# Patient Record
Sex: MALE | Race: WHITE | URBAN - METROPOLITAN AREA
[De-identification: names, ages, dates, MRNs, and addresses within clinical notes are randomized per-mention and may not be internally consistent; named-entity substitution may affect disease eponyms.]

---

## 2017-01-09 ENCOUNTER — TRANSFERRED RECORDS (OUTPATIENT)
Dept: HEALTH INFORMATION MANAGEMENT | Facility: CLINIC | Age: 62
End: 2017-01-09

## 2017-04-19 ENCOUNTER — TRANSFERRED RECORDS (OUTPATIENT)
Dept: HEALTH INFORMATION MANAGEMENT | Facility: CLINIC | Age: 62
End: 2017-04-19

## 2017-04-28 ENCOUNTER — TRANSFERRED RECORDS (OUTPATIENT)
Dept: HEALTH INFORMATION MANAGEMENT | Facility: CLINIC | Age: 62
End: 2017-04-28

## 2017-05-01 ENCOUNTER — TRANSFERRED RECORDS (OUTPATIENT)
Dept: HEALTH INFORMATION MANAGEMENT | Facility: CLINIC | Age: 62
End: 2017-05-01

## 2017-05-01 ENCOUNTER — MEDICAL CORRESPONDENCE (OUTPATIENT)
Dept: HEALTH INFORMATION MANAGEMENT | Facility: CLINIC | Age: 62
End: 2017-05-01

## 2017-05-04 NOTE — TELEPHONE ENCOUNTER
1.  Date/reason for appt: 17 - Distal Esophageal CA  2.  Referring provider: Dr Sammy Redmond    3.  Call to patient (Yes / No - short description): Yes  4.  Previous care at / records requested from: [facility], ZOILA Kurtz - in [facility]  5.  Recent Imagin17 CT chest, 17 CT abdomen, 17 Esophagram - in [facility]  6.  Path slides received 17    *Pt scheduled for PET CT on 17 @ 1614, prior to seeing Dr Hung*

## 2017-05-04 NOTE — TELEPHONE ENCOUNTER
1.  Date/reason for appt: 17 - Distal Esophageal CA  2.  Referring provider: Dr Sammy Redmond    3.  Call to patient (Yes / No - short description): Yes  4.  Previous care at / records requested from: [facility], ZOILA Kurtz - in [facility]  5.  Recent Imagin17 CT chest, 17 CT abdomen, 17 Esophagram - in [facility]

## 2017-05-05 PROCEDURE — 00000346 ZZHCL STATISTIC REVIEW OUTSIDE SLIDES TC 88321: Performed by: STUDENT IN AN ORGANIZED HEALTH CARE EDUCATION/TRAINING PROGRAM

## 2017-05-08 DIAGNOSIS — C15.9 MALIGNANT NEOPLASM OF ESOPHAGUS, UNSPECIFIED LOCATION (H): Primary | ICD-10-CM

## 2017-05-09 ASSESSMENT — ENCOUNTER SYMPTOMS
HALLUCINATIONS: 0
INCREASED ENERGY: 0
WEIGHT LOSS: 0
POLYDIPSIA: 0
CHILLS: 0
FEVER: 0
WEIGHT GAIN: 0
POLYPHAGIA: 0
DECREASED APPETITE: 0
FATIGUE: 1
ALTERED TEMPERATURE REGULATION: 0
NIGHT SWEATS: 0

## 2017-05-10 NOTE — PROGRESS NOTES
THORACIC SURGERY - NEW PATIENT OFFICE VISIT   Dear Dr. Redmond,    I saw Mr. Russo at your request in consultation for the evaluation and treatment of an esophageal cancer.     HPI  Mr. Samir Russo is a 61 year old year-old male with a newly diagnosed esophageal cancer. Patient has a history of obstructive sleep apnea and was having difficulty with his CPAP machine at home. He was seeing his primary care physician and ENT for this when he also was noted to have dysphagia and lots of burping. An EGD was performed which was notable for a GE junction mass, pathology confirmed adenocarcinoma. He is able to tolerate a regular diet. He has not had any unintentional weight loss or systemic signs of illness- reports he is otherwise doing well. He underwent a PET scan just prior to this appointment for his metastatic workup and presents in clinic today to discuss the workup and management of his esophageal cancer.     Previsit Tests   EGD performed on 4/28 with an EG junction mass. Biopsies from EGD showed invasive poorly to moderately differentiated adenocarcinoma.   CT Chest/abdomen/pelvis and esophogram from 4/28 show a GE junction mass.      PET scan performed early today (5/11) is not yet formally read but shows shows the known distal esophageal mass extending into the proximal stomach with multiple hypermetabolic local nodes suspicious for metastasis. Preliminary read also with upper abdominal and retroperitoneal nodes suspicious for jasmin metastasis.        PMH  Stroke   Obstructive sleep apnea   Dyslexia  Anxiety  Depression   Vitamin D deficiency  Arthritis    PSH  Heart catheterization, closure of PFO  Rotator cuff repair  Vasectomy    ETOH 1 beer with dinner nightly, though none for the past few weeks  TOB history of 4ppd smoking, quit in 1988    Physical examination  BMI 34.23  Well appearing male in no acute distress  Awake, alert and appropriate  Nonlabored breathing on room air  Regular rate and  rhythm  Chest and abdomen without any incisions  No supraclavicular or cervical lymphadenopathy    From a personal perspective, patient lives with his wife, Gunjan, in [place], [place]. They have one son, Tim, who is also at the appointment today and lives near them. He works in road construction.     IMPRESSION  (C15.9) Malignant neoplasm of esophagus, unspecified location (H)  (primary encounter diagnosis)    61 year old year-old male with a GE junction esophageal adenocarcinoma.     PLAN  I spent a total of 30 minutes with Mr. Russo and his family, more than 50% of which were spent in counseling, coordination of care, and face-to-face time. I reviewed the plan as follows:    Mr. Russo appears to have atleast a locoregionally advanced esophageal cancer with possible distant jasmin metastasis as noted on PET scan. We will hold off on EUS for now and plan to discuss him at our tumor conference for likely consideration of definitive vs. neoadjvant  Chemoradiation. Likely hematology-oncology and radiation-oncology follow up pending discussion.   All questions were answered and Smair Russo and present family were in agreement with the plan.  I appreciate the opportunity to participate in the care of your patient and will keep you updated.  Sincerely,

## 2017-05-11 ENCOUNTER — PRE VISIT (OUTPATIENT)
Dept: SURGERY | Facility: CLINIC | Age: 62
End: 2017-05-11

## 2017-05-11 ENCOUNTER — OFFICE VISIT (OUTPATIENT)
Dept: SURGERY | Facility: CLINIC | Age: 62
End: 2017-05-11
Attending: STUDENT IN AN ORGANIZED HEALTH CARE EDUCATION/TRAINING PROGRAM
Payer: COMMERCIAL

## 2017-05-11 ENCOUNTER — HOSPITAL ENCOUNTER (OUTPATIENT)
Dept: PET IMAGING | Facility: CLINIC | Age: 62
Discharge: HOME | End: 2017-05-11
Attending: CLINICAL NURSE SPECIALIST | Admitting: CLINICAL NURSE SPECIALIST
Payer: COMMERCIAL

## 2017-05-11 VITALS
SYSTOLIC BLOOD PRESSURE: 142 MMHG | HEART RATE: 78 BPM | DIASTOLIC BLOOD PRESSURE: 82 MMHG | WEIGHT: 250 LBS | HEIGHT: 72 IN | RESPIRATION RATE: 12 BRPM | OXYGEN SATURATION: 94 % | TEMPERATURE: 98.1 F | BODY MASS INDEX: 33.86 KG/M2

## 2017-05-11 VITALS — WEIGHT: 247 LBS

## 2017-05-11 DIAGNOSIS — C15.9 MALIGNANT NEOPLASM OF ESOPHAGUS, UNSPECIFIED LOCATION (H): Primary | ICD-10-CM

## 2017-05-11 DIAGNOSIS — C15.9 MALIGNANT NEOPLASM OF ESOPHAGUS, UNSPECIFIED LOCATION (H): ICD-10-CM

## 2017-05-11 LAB — GLUCOSE BLDC GLUCOMTR-MCNC: 90 MG/DL (ref 70–99)

## 2017-05-11 PROCEDURE — A9552 F18 FDG: HCPCS | Performed by: CLINICAL NURSE SPECIALIST

## 2017-05-11 PROCEDURE — 78816 PET IMAGE W/CT FULL BODY: CPT | Mod: PI

## 2017-05-11 PROCEDURE — 71260 CT THORAX DX C+: CPT

## 2017-05-11 PROCEDURE — 25500064 ZZH RX 255 OP 636: Performed by: CLINICAL NURSE SPECIALIST

## 2017-05-11 PROCEDURE — 34300033 ZZH RX 343: Performed by: CLINICAL NURSE SPECIALIST

## 2017-05-11 PROCEDURE — 82962 GLUCOSE BLOOD TEST: CPT

## 2017-05-11 PROCEDURE — 99212 OFFICE O/P EST SF 10 MIN: CPT | Mod: ZF

## 2017-05-11 RX ORDER — ASPIRIN 81 MG/1
TABLET ORAL
COMMUNITY

## 2017-05-11 RX ORDER — IOPAMIDOL 755 MG/ML
100 INJECTION, SOLUTION INTRAVASCULAR ONCE
Status: COMPLETED | OUTPATIENT
Start: 2017-05-11 | End: 2017-05-11

## 2017-05-11 RX ORDER — MULTIVITAMIN WITH IRON
TABLET ORAL
COMMUNITY

## 2017-05-11 RX ORDER — SERTRALINE HYDROCHLORIDE 100 MG/1
TABLET, FILM COATED ORAL
COMMUNITY
Start: 2016-11-04

## 2017-05-11 RX ORDER — MULTIVIT-MIN/IRON/FOLIC ACID/K 18-600-40
CAPSULE ORAL
COMMUNITY
Start: 2015-04-01

## 2017-05-11 RX ORDER — VITAMIN E 268 MG
CAPSULE ORAL
COMMUNITY

## 2017-05-11 RX ADMIN — IOPAMIDOL 133 ML: 755 INJECTION, SOLUTION INTRAVENOUS at 14:57

## 2017-05-11 RX ADMIN — FLUDEOXYGLUCOSE F-18 15.66 MCI.: 500 INJECTION, SOLUTION INTRAVENOUS at 14:00

## 2017-05-11 ASSESSMENT — PAIN SCALES - GENERAL: PAINLEVEL: NO PAIN (0)

## 2017-05-11 NOTE — LETTER
5/11/2017       RE: Samir Russo  [address]  [facility] [address]     Dear Colleague,    Thank you for referring your patient, Samir Russo, to the [facility]. Please see a copy of my visit note below.    THORACIC SURGERY - NEW PATIENT OFFICE VISIT   Dear Dr. Redmond,    I saw Mr. Russo at your request in consultation for the evaluation and treatment of an esophageal cancer.     HPI  Mr. Samir Russo is a 61 year old year-old male with a newly diagnosed esophageal cancer. Patient has a history of obstructive sleep apnea and was having difficulty with his CPAP machine at home. He was seeing his primary care physician and ENT for this when he also was noted to have dysphagia and lots of burping. An EGD was performed which was notable for a GE junction mass, pathology confirmed adenocarcinoma. He is able to tolerate a regular diet. He has not had any unintentional weight loss or systemic signs of illness- reports he is otherwise doing well. He underwent a PET scan just prior to this appointment for his metastatic workup and presents in clinic today to discuss the workup and management of his esophageal cancer.     Previsit Tests   EGD performed on 4/28 with an EG junction mass. Biopsies from EGD showed invasive poorly to moderately differentiated adenocarcinoma.   CT Chest/abdomen/pelvis and esophogram from 4/28 show a GE junction mass.      PET scan performed early today (5/11) is not yet formally read but shows shows the known distal esophageal mass extending into the proximal stomach with multiple hypermetabolic local nodes suspicious for metastasis. Preliminary read also with upper abdominal and retroperitoneal nodes suspicious for jasmin metastasis.        PMH  Stroke   Obstructive sleep apnea   Dyslexia  Anxiety  Depression   Vitamin D deficiency  Arthritis    PSH  Heart catheterization, closure of PFO  Rotator cuff repair  Vasectomy    ETOH 1 beer with dinner  nightly, though none for the past few weeks  TOB history of 4ppd smoking, quit in 1988    Physical examination  BMI 34.23  Well appearing male in no acute distress  Awake, alert and appropriate  Nonlabored breathing on room air  Regular rate and rhythm  Chest and abdomen without any incisions  No supraclavicular or cervical lymphadenopathy    From a personal perspective, patient lives with his wife, Gunjan, in [place], [place]. They have one son, Tim, who is also at the appointment today and lives near them. He works in road construction.     IMPRESSION  (C15.9) Malignant neoplasm of esophagus, unspecified location (H)  (primary encounter diagnosis)    61 year old year-old male with a GE junction esophageal adenocarcinoma.     PLAN  I spent a total of 30 minutes with Mr. Russo and his family, more than 50% of which were spent in counseling, coordination of care, and face-to-face time. I reviewed the plan as follows:    Mr. Russo appears to have atleast a locoregionally advanced esophageal cancer with possible distant jasmin metastasis as noted on PET scan. We will hold off on EUS for now and plan to discuss him at our tumor conference for likely consideration of definitive vs. neoadjvant  Chemoradiation. Likely hematology-oncology and radiation-oncology follow up pending discussion.   All questions were answered and Samir Russo and present family were in agreement with the plan.  I appreciate the opportunity to participate in the care of your patient and will keep you updated.  Sincerely,          Susan Hung MD

## 2017-05-11 NOTE — NURSING NOTE
"Oncology Rooming Note    May 11, 2017 3:45 PM   Samir Russo is a 61 year old male who presents for:    Chief Complaint   Patient presents with     Oncology Clinic Visit     distal esophageal      Initial Vitals: /82  Pulse 78  Temp 98.1  F (36.7  C) (Oral)  Resp 12  Ht 1.82 m (5' 11.65\")  Wt 113.4 kg (250 lb)  SpO2 94%  BMI 34.23 kg/m2 Estimated body mass index is 34.23 kg/(m^2) as calculated from the following:    Height as of this encounter: 1.82 m (5' 11.65\").    Weight as of this encounter: 113.4 kg (250 lb). Body surface area is 2.39 meters squared.  No Pain (0) Comment: Data Unavailable   No LMP for male patient.  Allergies reviewed: Yes  Medications reviewed: Yes    Medications: Medication refills not needed today.  Pharmacy name entered into EPIC: Data Unavailable    Clinical concerns: no Dr. Hung was NOT notified.    5 minutes for nursing intake (face to face time)     Elda James CMA              "

## 2017-05-11 NOTE — MR AVS SNAPSHOT
After Visit Summary   5/11/2017    Samir Russo    MRN: [id number]           Patient Information     Date Of Birth          1955        Visit Information        Provider Department      5/11/2017 3:30 PM Susan Hung MD [facility]        Today's Diagnoses     Malignant neoplasm of esophagus, unspecified location (H)    -  1       Follow-ups after your visit        Future tests that were ordered for you today     Open Future Orders        Priority Expected Expires Ordered    PET Oncology Whole Body Routine  8/6/2017 5/8/2017            Who to contact     If you have questions or need follow up information about today's clinic visit or your schedule please contact [facility] directly at [phone].  Normal or non-critical lab and imaging results will be communicated to you by Acoriohart, letter or phone within 4 business days after the clinic has received the results. If you do not hear from us within 7 days, please contact the clinic through Acoriohart or phone. If you have a critical or abnormal lab result, we will notify you by phone as soon as possible.  Submit refill requests through [employer] or call your pharmacy and they will forward the refill request to us. Please allow 3 business days for your refill to be completed.          Additional Information About Your Visit        MyChart Information     [employer] gives you secure access to your electronic health record. If you see a primary care provider, you can also send messages to your care team and make appointments. If you have questions, please call your primary care clinic.  If you do not have a primary care provider, please call [phone] and they will assist you.        Care EveryWhere ID     This is your Care EveryWhere ID. This could be used by other organizations to access your [place] medical records  [id number]        Your Vitals Were     Pulse Temperature Respirations Height Pulse  "Oximetry BMI (Body Mass Index)    78 98.1  F (36.7  C) (Oral) 12 1.82 m (5' 11.65\") 94% 34.23 kg/m2       Blood Pressure from Last 3 Encounters:   05/11/17 142/82    Weight from Last 3 Encounters:   05/11/17 113.4 kg (250 lb)   05/11/17 112 kg (247 lb)              We Performed the Following     UPPER EUS        Primary Care Provider Office Phone # Fax #    Ritchie Stevens [phone]       [facility] [address]  [facility] [address]        Thank you!     Thank you for choosing [facility]  for your care. Our goal is always to provide you with excellent care. Hearing back from our patients is one way we can continue to improve our services. Please take a few minutes to complete the written survey that you may receive in the mail after your visit with us. Thank you!             Your Updated Medication List - Protect others around you: Learn how to safely use, store and throw away your medicines at www.disposemymeds.org.          This list is accurate as of: 5/11/17 11:59 PM.  Always use your most recent med list.                   Brand Name Dispense Instructions for use    aspirin EC 81 MG EC tablet          FISH OIL CONCENTRATE 300 MG Caps          NEW MED          RA SAW PALMETTO 80 MG Caps   Generic drug:  Saw Palmetto (Serenoa repens)          * sertraline 50 MG tablet    ZOLOFT         * sertraline 100 MG tablet    ZOLOFT         * UNABLE TO FIND          * UNABLE TO FIND          * UNABLE TO FIND          * UNABLE TO FIND      Equipment Prescribed: Please change CPAP at 7 cm H20.  Fax me a new printout in April - [phone].  Thanks!Diagnosis: Obstructive Sleep Apnea Estimated Length of Need: 99 months (99 = Lifetime)DME may change the size or style of mask or headgear to help patient comfort and compliance.       Vitamin D (Cholecalciferol) 1000 UNITS Tabs          vitamin E 400 UNIT capsule          * Notice:  This list has 6 medication(s) that are the " same as other medications prescribed for you. Read the directions carefully, and ask your doctor or other care provider to review them with you.

## 2017-05-12 LAB — COPATH REPORT: NORMAL

## 2017-05-16 ENCOUNTER — TEAM CONFERENCE (OUTPATIENT)
Dept: SURGERY | Facility: CLINIC | Age: 62
End: 2017-05-16

## 2017-05-16 DIAGNOSIS — C15.9 MALIGNANT NEOPLASM OF ESOPHAGUS, UNSPECIFIED LOCATION (H): Primary | ICD-10-CM

## 2017-05-16 NOTE — TELEPHONE ENCOUNTER
Thoracic Tumor Conference      Patient Name: Samir Russo    Reason for conference discussion (brief overview): 61 year old with newly diagnosed esophageal cancer. PET/CT shows:    1. In this patient with newly diagnosed esophageal cancer:  a. Hypermetabolic mass with circumferential thickening of the distal  esophagus extending into the proximal stomach with primary malignancy.  b. Multiple nonenlarged periesophageal nodes with hypermetabolism  suspicious for jasmin metastases.  c. Multiple upper abdominal and retroperitoneal nodes with  hypermetabolism suspicious for jasmin metastases.  2. Small bilateral calcified pleural plaques consistent with a history  of asbestosis.    Specific Question:  Should these nodes be sampled via EUS or definitive chemotherapy and radiation therapy.    Pertinent Histology:  Moderately differentiated adenocarcinoma    Referring Physician: Dr. Susan Hung    The patient's case was presented at the multidisciplinary conference for the above noted reason.        There was a consensus recommendation for the following actions:     Talk to radiology to identify which nodules they are calling out on the PET and then consider EUS to complete staging.    Case Lead:  Deepa Urban    Interventional Radiology Staff Present: N/A

## 2017-05-17 ENCOUNTER — PRE VISIT (OUTPATIENT)
Dept: SURGERY | Facility: CLINIC | Age: 62
End: 2017-05-17

## 2017-05-22 ENCOUNTER — TELEPHONE (OUTPATIENT)
Dept: SURGERY | Facility: CLINIC | Age: 62
End: 2017-05-22

## 2017-05-22 NOTE — TELEPHONE ENCOUNTER
"Spoke with Dr. Christopher Stearns, radiologist in the PET/CT reading room. Series 605, image 105 is a \"clearly positive\" upper abdominal lymph node and image 120 shows bilateral positive retroperitoneal lymph nodes. I will discuss this with Dr. Hung to determine whether a tissue biopsy of these nodes is necessary.  "

## 2020-03-10 ENCOUNTER — HEALTH MAINTENANCE LETTER (OUTPATIENT)
Age: 65
End: 2020-03-10

## 2020-12-27 ENCOUNTER — HEALTH MAINTENANCE LETTER (OUTPATIENT)
Age: 65
End: 2020-12-27

## 2021-04-24 ENCOUNTER — HEALTH MAINTENANCE LETTER (OUTPATIENT)
Age: 66
End: 2021-04-24

## 2021-10-09 ENCOUNTER — HEALTH MAINTENANCE LETTER (OUTPATIENT)
Age: 66
End: 2021-10-09

## 2022-05-21 ENCOUNTER — HEALTH MAINTENANCE LETTER (OUTPATIENT)
Age: 67
End: 2022-05-21

## 2022-09-11 ENCOUNTER — HEALTH MAINTENANCE LETTER (OUTPATIENT)
Age: 67
End: 2022-09-11

## 2023-06-03 ENCOUNTER — HEALTH MAINTENANCE LETTER (OUTPATIENT)
Age: 68
End: 2023-06-03